# Patient Record
Sex: FEMALE | Race: WHITE | NOT HISPANIC OR LATINO | Employment: OTHER | URBAN - METROPOLITAN AREA
[De-identification: names, ages, dates, MRNs, and addresses within clinical notes are randomized per-mention and may not be internally consistent; named-entity substitution may affect disease eponyms.]

---

## 2017-01-01 ENCOUNTER — APPOINTMENT (OUTPATIENT)
Dept: CARDIOLOGY | Facility: HOSPITAL | Age: 72
End: 2017-01-01
Attending: PSYCHIATRY & NEUROLOGY

## 2017-01-01 ENCOUNTER — APPOINTMENT (OUTPATIENT)
Dept: CT IMAGING | Facility: HOSPITAL | Age: 72
End: 2017-01-01

## 2017-01-01 ENCOUNTER — APPOINTMENT (OUTPATIENT)
Dept: CT IMAGING | Facility: HOSPITAL | Age: 72
End: 2017-01-01
Attending: PSYCHIATRY & NEUROLOGY

## 2017-01-01 ENCOUNTER — APPOINTMENT (OUTPATIENT)
Dept: GENERAL RADIOLOGY | Facility: HOSPITAL | Age: 72
End: 2017-01-01

## 2017-01-01 ENCOUNTER — APPOINTMENT (OUTPATIENT)
Dept: MRI IMAGING | Facility: HOSPITAL | Age: 72
End: 2017-01-01

## 2017-01-01 ENCOUNTER — HOSPITAL ENCOUNTER (INPATIENT)
Facility: HOSPITAL | Age: 72
LOS: 6 days | End: 2017-06-30
Attending: EMERGENCY MEDICINE | Admitting: FAMILY MEDICINE

## 2017-01-01 DIAGNOSIS — R41.841 COGNITIVE COMMUNICATION DEFICIT: ICD-10-CM

## 2017-01-01 DIAGNOSIS — R10.84 GENERALIZED ABDOMINAL PAIN: ICD-10-CM

## 2017-01-01 DIAGNOSIS — Z74.09 IMPAIRED MOBILITY AND ADLS: ICD-10-CM

## 2017-01-01 DIAGNOSIS — R41.82 ALTERED MENTAL STATUS, UNSPECIFIED: Primary | ICD-10-CM

## 2017-01-01 DIAGNOSIS — Z74.09 IMPAIRED FUNCTIONAL MOBILITY, BALANCE, GAIT, AND ENDURANCE: ICD-10-CM

## 2017-01-01 DIAGNOSIS — Z78.9 IMPAIRED MOBILITY AND ADLS: ICD-10-CM

## 2017-01-01 DIAGNOSIS — A41.9 SEPSIS, DUE TO UNSPECIFIED ORGANISM: ICD-10-CM

## 2017-01-01 DIAGNOSIS — N39.0 ACUTE UTI: ICD-10-CM

## 2017-01-01 LAB
ABO GROUP BLD: NORMAL
ABO GROUP BLD: NORMAL
ALBUMIN SERPL-MCNC: 2.4 G/DL (ref 3.2–4.8)
ALBUMIN/GLOB SERPL: 0.6 G/DL (ref 1.5–2.5)
ALP SERPL-CCNC: 198 U/L (ref 25–100)
ALT SERPL W P-5'-P-CCNC: 11 U/L (ref 7–40)
AMMONIA BLD-SCNC: 17 UMOL/L (ref 19–60)
ANION GAP SERPL CALCULATED.3IONS-SCNC: 4 MMOL/L (ref 3–11)
ANION GAP SERPL CALCULATED.3IONS-SCNC: 4 MMOL/L (ref 3–11)
ANION GAP SERPL CALCULATED.3IONS-SCNC: 5 MMOL/L (ref 3–11)
ANION GAP SERPL CALCULATED.3IONS-SCNC: 6 MMOL/L (ref 3–11)
ANION GAP SERPL CALCULATED.3IONS-SCNC: 7 MMOL/L (ref 3–11)
ANION GAP SERPL CALCULATED.3IONS-SCNC: 7 MMOL/L (ref 3–11)
ANION GAP SERPL CALCULATED.3IONS-SCNC: 8 MMOL/L (ref 3–11)
APTT PPP: 31.9 SECONDS (ref 45–60)
APTT PPP: 32.2 SECONDS (ref 24–31)
APTT PPP: 32.4 SECONDS (ref 45–60)
APTT PPP: 41.8 SECONDS (ref 45–60)
APTT PPP: 45.6 SECONDS (ref 45–60)
APTT PPP: 55.3 SECONDS (ref 45–60)
APTT PPP: 56.5 SECONDS (ref 45–60)
ARTERIAL PATENCY WRIST A: ABNORMAL
ARTICHOKE IGE QN: 43 MG/DL (ref 0–130)
AST SERPL-CCNC: 13 U/L (ref 0–33)
ATMOSPHERIC PRESS: ABNORMAL MMHG
BACTERIA SPEC AEROBE CULT: ABNORMAL
BACTERIA SPEC AEROBE CULT: NORMAL
BACTERIA SPEC AEROBE CULT: NORMAL
BACTERIA UR QL AUTO: ABNORMAL /HPF
BASE EXCESS BLDA CALC-SCNC: -2.2 MMOL/L (ref 0–2)
BASOPHILS # BLD AUTO: 0 10*3/MM3 (ref 0–0.2)
BASOPHILS # BLD AUTO: 0.01 10*3/MM3 (ref 0–0.2)
BASOPHILS # BLD MANUAL: 0 10*3/MM3 (ref 0–0.2)
BASOPHILS # BLD MANUAL: 0 10*3/MM3 (ref 0–0.2)
BASOPHILS NFR BLD AUTO: 0 % (ref 0–1)
BASOPHILS NFR BLD AUTO: 0.1 % (ref 0–1)
BDY SITE: ABNORMAL
BH CV ECHO MEAS - AO ROOT AREA (BSA CORRECTED): 1.3
BH CV ECHO MEAS - AO ROOT AREA: 2.8 CM^2
BH CV ECHO MEAS - AO ROOT DIAM: 1.9 CM
BH CV ECHO MEAS - BSA(HAYCOCK): 1.4 M^2
BH CV ECHO MEAS - BSA: 1.4 M^2
BH CV ECHO MEAS - BZI_BMI: 17 KILOGRAMS/M^2
BH CV ECHO MEAS - BZI_METRIC_HEIGHT: 160 CM
BH CV ECHO MEAS - BZI_METRIC_WEIGHT: 43.5 KG
BH CV ECHO MEAS - CONTRAST EF (2CH): 51.4 ML/M^2
BH CV ECHO MEAS - CONTRAST EF 4CH: 57.1 ML/M^2
BH CV ECHO MEAS - EDV(CUBED): 37.6 ML
BH CV ECHO MEAS - EDV(MOD-SP2): 35 ML
BH CV ECHO MEAS - EDV(MOD-SP4): 63 ML
BH CV ECHO MEAS - EDV(TEICH): 45.8 ML
BH CV ECHO MEAS - EF(CUBED): 65 %
BH CV ECHO MEAS - EF(MOD-SP2): 51.4 %
BH CV ECHO MEAS - EF(MOD-SP4): 57.1 %
BH CV ECHO MEAS - EF(TEICH): 57.8 %
BH CV ECHO MEAS - ESV(CUBED): 13.1 ML
BH CV ECHO MEAS - ESV(MOD-SP2): 17 ML
BH CV ECHO MEAS - ESV(MOD-SP4): 27 ML
BH CV ECHO MEAS - ESV(TEICH): 19.3 ML
BH CV ECHO MEAS - FS: 29.6 %
BH CV ECHO MEAS - IVS/LVPW: 1
BH CV ECHO MEAS - IVSD: 1.1 CM
BH CV ECHO MEAS - LA DIMENSION: 2.2 CM
BH CV ECHO MEAS - LA/AO: 1.2
BH CV ECHO MEAS - LV DIASTOLIC VOL/BSA (35-75): 44.5 ML/M^2
BH CV ECHO MEAS - LV MASS(C)D: 104.7 GRAMS
BH CV ECHO MEAS - LV MASS(C)DI: 74 GRAMS/M^2
BH CV ECHO MEAS - LV SYSTOLIC VOL/BSA (12-30): 19.1 ML/M^2
BH CV ECHO MEAS - LVIDD: 3.4 CM
BH CV ECHO MEAS - LVIDS: 2.4 CM
BH CV ECHO MEAS - LVLD AP2: 3.6 CM
BH CV ECHO MEAS - LVLD AP4: 6.3 CM
BH CV ECHO MEAS - LVLS AP2: 4.1 CM
BH CV ECHO MEAS - LVLS AP4: 5.5 CM
BH CV ECHO MEAS - LVPWD: 1.1 CM
BH CV ECHO MEAS - MV A MAX VEL: 76.5 CM/SEC
BH CV ECHO MEAS - MV DEC SLOPE: 674 CM/SEC^2
BH CV ECHO MEAS - MV DEC TIME: 0.14 SEC
BH CV ECHO MEAS - MV E MAX VEL: 81.9 CM/SEC
BH CV ECHO MEAS - MV E/A: 1.1
BH CV ECHO MEAS - MV P1/2T MAX VEL: 86.9 CM/SEC
BH CV ECHO MEAS - MV P1/2T: 37.8 MSEC
BH CV ECHO MEAS - MVA P1/2T LCG: 2.5 CM^2
BH CV ECHO MEAS - MVA(P1/2T): 5.8 CM^2
BH CV ECHO MEAS - RAP SYSTOLE: 3 MMHG
BH CV ECHO MEAS - RVSP: 31 MMHG
BH CV ECHO MEAS - SI(CUBED): 17.3 ML/M^2
BH CV ECHO MEAS - SI(MOD-SP2): 12.7 ML/M^2
BH CV ECHO MEAS - SI(MOD-SP4): 25.4 ML/M^2
BH CV ECHO MEAS - SI(TEICH): 18.7 ML/M^2
BH CV ECHO MEAS - SV(CUBED): 24.5 ML
BH CV ECHO MEAS - SV(MOD-SP2): 18 ML
BH CV ECHO MEAS - SV(MOD-SP4): 36 ML
BH CV ECHO MEAS - SV(TEICH): 26.4 ML
BH CV ECHO MEAS - TAPSE (>1.6): 2.5 CM2
BH CV ECHO MEAS - TR MAX VEL: 265 CM/SEC
BH CV XLRA - RV BASE: 2.7 CM
BH CV XLRA - RV LENGTH: 5.3 CM
BH CV XLRA - RV MID: 2.4 CM
BH CV XLRA MEAS LEFT CCA RATIO VEL: 53.1 CM/SEC
BH CV XLRA MEAS LEFT DIST CCA EDV: 12.2 CM/SEC
BH CV XLRA MEAS LEFT DIST CCA PSV: 53.1 CM/SEC
BH CV XLRA MEAS LEFT DIST ICA EDV: 23.4 CM/SEC
BH CV XLRA MEAS LEFT DIST ICA PSV: 67.4 CM/SEC
BH CV XLRA MEAS LEFT ICA RATIO VEL: 53.8 CM/SEC
BH CV XLRA MEAS LEFT ICA/CCA RATIO: 1
BH CV XLRA MEAS LEFT MID ICA EDV: 11.5 CM/SEC
BH CV XLRA MEAS LEFT MID ICA PSV: 36.3 CM/SEC
BH CV XLRA MEAS LEFT PROX CCA EDV: 9.8 CM/SEC
BH CV XLRA MEAS LEFT PROX CCA PSV: 53.4 CM/SEC
BH CV XLRA MEAS LEFT PROX ECA PSV: 59 CM/SEC
BH CV XLRA MEAS LEFT PROX ICA EDV: 14.7 CM/SEC
BH CV XLRA MEAS LEFT PROX ICA PSV: 53.8 CM/SEC
BH CV XLRA MEAS LEFT PROX SCLA PSV: 94.3 CM/SEC
BH CV XLRA MEAS LEFT VERTEBRAL A EDV: 18.9 CM/SEC
BH CV XLRA MEAS LEFT VERTEBRAL A PSV: 54.5 CM/SEC
BH CV XLRA MEAS RIGHT CCA RATIO VEL: 65.9 CM/SEC
BH CV XLRA MEAS RIGHT DIST CCA EDV: 14 CM/SEC
BH CV XLRA MEAS RIGHT DIST CCA PSV: 65.9 CM/SEC
BH CV XLRA MEAS RIGHT DIST ICA EDV: 28.8 CM/SEC
BH CV XLRA MEAS RIGHT DIST ICA PSV: 76 CM/SEC
BH CV XLRA MEAS RIGHT ICA RATIO VEL: 72.5 CM/SEC
BH CV XLRA MEAS RIGHT ICA/CCA RATIO: 1.1
BH CV XLRA MEAS RIGHT MID ICA EDV: 15.3 CM/SEC
BH CV XLRA MEAS RIGHT MID ICA PSV: 72.5 CM/SEC
BH CV XLRA MEAS RIGHT PROX CCA EDV: 13.5 CM/SEC
BH CV XLRA MEAS RIGHT PROX CCA PSV: 43.7 CM/SEC
BH CV XLRA MEAS RIGHT PROX ECA PSV: 64.2 CM/SEC
BH CV XLRA MEAS RIGHT PROX ICA EDV: 12.2 CM/SEC
BH CV XLRA MEAS RIGHT PROX ICA PSV: 66.4 CM/SEC
BH CV XLRA MEAS RIGHT PROX SCLA PSV: 71.6 CM/SEC
BH CV XLRA MEAS RIGHT VERTEBRAL A EDV: 10.9 CM/SEC
BH CV XLRA MEAS RIGHT VERTEBRAL A PSV: 25.3 CM/SEC
BILIRUB SERPL-MCNC: 0.2 MG/DL (ref 0.3–1.2)
BILIRUB UR QL STRIP: ABNORMAL
BLD GP AB SCN SERPL QL: NEGATIVE
BUN BLD-MCNC: 13 MG/DL (ref 9–23)
BUN BLD-MCNC: 15 MG/DL (ref 9–23)
BUN BLD-MCNC: 17 MG/DL (ref 9–23)
BUN BLD-MCNC: 17 MG/DL (ref 9–23)
BUN BLD-MCNC: 9 MG/DL (ref 9–23)
BUN/CREAT SERPL: 25 (ref 7–25)
BUN/CREAT SERPL: 28.3 (ref 7–25)
BUN/CREAT SERPL: 30 (ref 7–25)
BUN/CREAT SERPL: 32.5 (ref 7–25)
BUN/CREAT SERPL: 32.5 (ref 7–25)
BUN/CREAT SERPL: 34 (ref 7–25)
BUN/CREAT SERPL: 43.3 (ref 7–25)
C DIFF TOX GENS STL QL NAA+PROBE: NOT DETECTED
CA-I SERPL ISE-MCNC: 0.97 MMOL/L (ref 1.12–1.32)
CA-I SERPL ISE-MCNC: 1.02 MMOL/L (ref 1.12–1.32)
CALCIUM SPEC-SCNC: 6.7 MG/DL (ref 8.7–10.4)
CALCIUM SPEC-SCNC: 7 MG/DL (ref 8.7–10.4)
CALCIUM SPEC-SCNC: 7.1 MG/DL (ref 8.7–10.4)
CALCIUM SPEC-SCNC: 7.2 MG/DL (ref 8.7–10.4)
CALCIUM SPEC-SCNC: 7.3 MG/DL (ref 8.7–10.4)
CALCIUM SPEC-SCNC: 7.4 MG/DL (ref 8.7–10.4)
CALCIUM SPEC-SCNC: 7.8 MG/DL (ref 8.7–10.4)
CHLORIDE SERPL-SCNC: 102 MMOL/L (ref 99–109)
CHLORIDE SERPL-SCNC: 110 MMOL/L (ref 99–109)
CHLORIDE SERPL-SCNC: 111 MMOL/L (ref 99–109)
CHLORIDE SERPL-SCNC: 112 MMOL/L (ref 99–109)
CHLORIDE SERPL-SCNC: 113 MMOL/L (ref 99–109)
CHLORIDE SERPL-SCNC: 114 MMOL/L (ref 99–109)
CHLORIDE SERPL-SCNC: 114 MMOL/L (ref 99–109)
CHOLEST SERPL-MCNC: 81 MG/DL (ref 0–200)
CLARITY UR: ABNORMAL
CO2 BLDA-SCNC: 21.8 MMOL/L (ref 22–33)
CO2 SERPL-SCNC: 19 MMOL/L (ref 20–31)
CO2 SERPL-SCNC: 20 MMOL/L (ref 20–31)
CO2 SERPL-SCNC: 21 MMOL/L (ref 20–31)
CO2 SERPL-SCNC: 21 MMOL/L (ref 20–31)
CO2 SERPL-SCNC: 23 MMOL/L (ref 20–31)
CO2 SERPL-SCNC: 24 MMOL/L (ref 20–31)
CO2 SERPL-SCNC: 27 MMOL/L (ref 20–31)
COHGB MFR BLD: 1.1 % (ref 0–2)
COLOR UR: ABNORMAL
CREAT BLD-MCNC: 0.3 MG/DL (ref 0.6–1.3)
CREAT BLD-MCNC: 0.3 MG/DL (ref 0.6–1.3)
CREAT BLD-MCNC: 0.4 MG/DL (ref 0.6–1.3)
CREAT BLD-MCNC: 0.4 MG/DL (ref 0.6–1.3)
CREAT BLD-MCNC: 0.5 MG/DL (ref 0.6–1.3)
CREAT BLD-MCNC: 0.6 MG/DL (ref 0.6–1.3)
CREAT BLD-MCNC: 0.6 MG/DL (ref 0.6–1.3)
D-LACTATE SERPL-SCNC: 0.6 MMOL/L (ref 0.5–2)
D-LACTATE SERPL-SCNC: 2.2 MMOL/L (ref 0.5–2)
D-LACTATE SERPL-SCNC: 2.3 MMOL/L (ref 0.5–2)
D-LACTATE SERPL-SCNC: 3.7 MMOL/L (ref 0.5–2)
DEPRECATED RDW RBC AUTO: 57.2 FL (ref 37–54)
DEPRECATED RDW RBC AUTO: 58.9 FL (ref 37–54)
DEPRECATED RDW RBC AUTO: 59.2 FL (ref 37–54)
DEPRECATED RDW RBC AUTO: 59.6 FL (ref 37–54)
DEPRECATED RDW RBC AUTO: 60.3 FL (ref 37–54)
DEPRECATED RDW RBC AUTO: 60.7 FL (ref 37–54)
DEPRECATED RDW RBC AUTO: 60.7 FL (ref 37–54)
DEPRECATED RDW RBC AUTO: 61.4 FL (ref 37–54)
EOSINOPHIL # BLD AUTO: 0 10*3/MM3 (ref 0.1–0.3)
EOSINOPHIL # BLD AUTO: 0 10*3/MM3 (ref 0–0.3)
EOSINOPHIL # BLD AUTO: 0.01 10*3/MM3 (ref 0–0.3)
EOSINOPHIL # BLD AUTO: 0.02 10*3/MM3 (ref 0–0.3)
EOSINOPHIL # BLD MANUAL: 0 10*3/MM3 (ref 0.1–0.3)
EOSINOPHIL # BLD MANUAL: 0 10*3/MM3 (ref 0.1–0.3)
EOSINOPHIL NFR BLD AUTO: 0 % (ref 0–3)
EOSINOPHIL NFR BLD AUTO: 0 % (ref 0–3)
EOSINOPHIL NFR BLD AUTO: 0.1 % (ref 0–3)
EOSINOPHIL NFR BLD AUTO: 0.2 % (ref 0–3)
EOSINOPHIL NFR BLD MANUAL: 0 % (ref 0–3)
EOSINOPHIL NFR BLD MANUAL: 0 % (ref 0–3)
ERYTHROCYTE [DISTWIDTH] IN BLOOD BY AUTOMATED COUNT: 16.2 % (ref 11.3–14.5)
ERYTHROCYTE [DISTWIDTH] IN BLOOD BY AUTOMATED COUNT: 16.3 % (ref 11.3–14.5)
ERYTHROCYTE [DISTWIDTH] IN BLOOD BY AUTOMATED COUNT: 16.5 % (ref 11.3–14.5)
ERYTHROCYTE [DISTWIDTH] IN BLOOD BY AUTOMATED COUNT: 16.7 % (ref 11.3–14.5)
ERYTHROCYTE [DISTWIDTH] IN BLOOD BY AUTOMATED COUNT: 16.7 % (ref 11.3–14.5)
ERYTHROCYTE [DISTWIDTH] IN BLOOD BY AUTOMATED COUNT: 16.9 % (ref 11.3–14.5)
ERYTHROCYTE [DISTWIDTH] IN BLOOD BY AUTOMATED COUNT: 17 % (ref 11.3–14.5)
ERYTHROCYTE [DISTWIDTH] IN BLOOD BY AUTOMATED COUNT: 17 % (ref 11.3–14.5)
GFR SERPL CREATININE-BSD FRML MDRD: 122 ML/MIN/1.73
GFR SERPL CREATININE-BSD FRML MDRD: 99 ML/MIN/1.73
GFR SERPL CREATININE-BSD FRML MDRD: 99 ML/MIN/1.73
GFR SERPL CREATININE-BSD FRML MDRD: >150 ML/MIN/1.73
GLOBULIN UR ELPH-MCNC: 4.1 GM/DL
GLUCOSE BLD-MCNC: 111 MG/DL (ref 70–100)
GLUCOSE BLD-MCNC: 58 MG/DL (ref 70–100)
GLUCOSE BLD-MCNC: 72 MG/DL (ref 70–100)
GLUCOSE BLD-MCNC: 78 MG/DL (ref 70–100)
GLUCOSE BLD-MCNC: 87 MG/DL (ref 70–100)
GLUCOSE BLD-MCNC: 88 MG/DL (ref 70–100)
GLUCOSE BLD-MCNC: 96 MG/DL (ref 70–100)
GLUCOSE BLDC GLUCOMTR-MCNC: 118 MG/DL (ref 70–130)
GLUCOSE BLDC GLUCOMTR-MCNC: 71 MG/DL (ref 70–130)
GLUCOSE UR STRIP-MCNC: NEGATIVE MG/DL
HBA1C MFR BLD: 5.6 % (ref 4.8–5.6)
HCO3 BLDA-SCNC: 20.9 MMOL/L (ref 20–26)
HCT VFR BLD AUTO: 31.2 % (ref 34.5–44)
HCT VFR BLD AUTO: 31.8 % (ref 34.5–44)
HCT VFR BLD AUTO: 32.3 % (ref 34.5–44)
HCT VFR BLD AUTO: 32.8 % (ref 34.5–44)
HCT VFR BLD AUTO: 33.1 % (ref 34.5–44)
HCT VFR BLD AUTO: 33.7 % (ref 34.5–44)
HCT VFR BLD AUTO: 35.9 % (ref 34.5–44)
HCT VFR BLD AUTO: 40.7 % (ref 34.5–44)
HCT VFR BLD CALC: 30.6 %
HDLC SERPL-MCNC: 25 MG/DL (ref 40–60)
HGB BLD-MCNC: 10.1 G/DL (ref 11.5–15.5)
HGB BLD-MCNC: 10.2 G/DL (ref 11.5–15.5)
HGB BLD-MCNC: 10.5 G/DL (ref 11.5–15.5)
HGB BLD-MCNC: 10.6 G/DL (ref 11.5–15.5)
HGB BLD-MCNC: 11.1 G/DL (ref 11.5–15.5)
HGB BLD-MCNC: 12.9 G/DL (ref 11.5–15.5)
HGB BLDA-MCNC: 10 G/DL (ref 14–18)
HGB UR QL STRIP.AUTO: NEGATIVE
HOLD SPECIMEN: NORMAL
HOLD SPECIMEN: NORMAL
HOROWITZ INDEX BLD+IHG-RTO: 21 %
HYALINE CASTS UR QL AUTO: ABNORMAL /LPF
IMM GRANULOCYTES # BLD: 0.03 10*3/MM3 (ref 0–0.03)
IMM GRANULOCYTES # BLD: 0.04 10*3/MM3 (ref 0–0.03)
IMM GRANULOCYTES # BLD: 0.04 10*3/MM3 (ref 0–0.03)
IMM GRANULOCYTES # BLD: 0.05 10*3/MM3 (ref 0–0.03)
IMM GRANULOCYTES NFR BLD: 0.3 % (ref 0–0.6)
IMM GRANULOCYTES NFR BLD: 0.3 % (ref 0–0.6)
IMM GRANULOCYTES NFR BLD: 0.4 % (ref 0–0.6)
IMM GRANULOCYTES NFR BLD: 0.6 % (ref 0–0.6)
INR PPP: 1.12
KETONES UR QL STRIP: ABNORMAL
LEFT ATRIUM VOLUME INDEX: 14.1 ML/M2
LEFT ATRIUM VOLUME: 20 CM3
LEUKOCYTE ESTERASE UR QL STRIP.AUTO: ABNORMAL
LIPASE SERPL-CCNC: 19 U/L (ref 6–51)
LV EF 2D ECHO EST: 55 %
LYMPHOCYTES # BLD AUTO: 2.77 10*3/MM3 (ref 0.6–4.8)
LYMPHOCYTES # BLD AUTO: 3.58 10*3/MM3 (ref 0.6–4.8)
LYMPHOCYTES # BLD AUTO: 4.43 10*3/MM3 (ref 0.6–4.8)
LYMPHOCYTES # BLD AUTO: 5.44 10*3/MM3 (ref 0.6–4.8)
LYMPHOCYTES # BLD MANUAL: 2.66 10*3/MM3 (ref 0.6–4.8)
LYMPHOCYTES # BLD MANUAL: 7.23 10*3/MM3 (ref 0.6–4.8)
LYMPHOCYTES NFR BLD AUTO: 34.4 % (ref 24–44)
LYMPHOCYTES NFR BLD AUTO: 37.7 % (ref 24–44)
LYMPHOCYTES NFR BLD AUTO: 41.5 % (ref 24–44)
LYMPHOCYTES NFR BLD AUTO: 47.3 % (ref 24–44)
LYMPHOCYTES NFR BLD MANUAL: 10 % (ref 0–12)
LYMPHOCYTES NFR BLD MANUAL: 22 % (ref 24–44)
LYMPHOCYTES NFR BLD MANUAL: 3 % (ref 0–12)
LYMPHOCYTES NFR BLD MANUAL: 57 % (ref 24–44)
MAGNESIUM SERPL-MCNC: 1.7 MG/DL (ref 1.3–2.7)
MAGNESIUM SERPL-MCNC: 2 MG/DL (ref 1.3–2.7)
MCH RBC QN AUTO: 30.6 PG (ref 27–31)
MCH RBC QN AUTO: 30.7 PG (ref 27–31)
MCH RBC QN AUTO: 30.9 PG (ref 27–31)
MCH RBC QN AUTO: 31 PG (ref 27–31)
MCH RBC QN AUTO: 31.2 PG (ref 27–31)
MCH RBC QN AUTO: 31.3 PG (ref 27–31)
MCH RBC QN AUTO: 31.6 PG (ref 27–31)
MCH RBC QN AUTO: 31.7 PG (ref 27–31)
MCHC RBC AUTO-ENTMCNC: 30.9 G/DL (ref 32–36)
MCHC RBC AUTO-ENTMCNC: 31.5 G/DL (ref 32–36)
MCHC RBC AUTO-ENTMCNC: 31.7 G/DL (ref 32–36)
MCHC RBC AUTO-ENTMCNC: 31.7 G/DL (ref 32–36)
MCHC RBC AUTO-ENTMCNC: 31.8 G/DL (ref 32–36)
MCHC RBC AUTO-ENTMCNC: 32 G/DL (ref 32–36)
MCHC RBC AUTO-ENTMCNC: 32.5 G/DL (ref 32–36)
MCHC RBC AUTO-ENTMCNC: 32.7 G/DL (ref 32–36)
MCV RBC AUTO: 100 FL (ref 80–99)
MCV RBC AUTO: 96.4 FL (ref 80–99)
MCV RBC AUTO: 96.8 FL (ref 80–99)
MCV RBC AUTO: 96.9 FL (ref 80–99)
MCV RBC AUTO: 97.3 FL (ref 80–99)
MCV RBC AUTO: 97.7 FL (ref 80–99)
MCV RBC AUTO: 98.5 FL (ref 80–99)
MCV RBC AUTO: 98.5 FL (ref 80–99)
METHGB BLD QL: 0 %
MODALITY: ABNORMAL
MONOCYTES # BLD AUTO: 0.36 10*3/MM3 (ref 0–1)
MONOCYTES # BLD AUTO: 0.54 10*3/MM3 (ref 0–1)
MONOCYTES # BLD AUTO: 0.66 10*3/MM3 (ref 0–1)
MONOCYTES # BLD AUTO: 0.71 10*3/MM3 (ref 0–1)
MONOCYTES # BLD AUTO: 0.73 10*3/MM3 (ref 0–1)
MONOCYTES # BLD AUTO: 1.27 10*3/MM3 (ref 0–1)
MONOCYTES NFR BLD AUTO: 6.3 % (ref 0–12)
MONOCYTES NFR BLD AUTO: 6.7 % (ref 0–12)
MONOCYTES NFR BLD AUTO: 6.7 % (ref 0–12)
MONOCYTES NFR BLD AUTO: 6.9 % (ref 0–12)
NEUTROPHILS # BLD AUTO: 4.18 10*3/MM3 (ref 1.5–8.3)
NEUTROPHILS # BLD AUTO: 4.69 10*3/MM3 (ref 1.5–8.3)
NEUTROPHILS # BLD AUTO: 5.2 10*3/MM3 (ref 1.5–8.3)
NEUTROPHILS # BLD AUTO: 5.26 10*3/MM3 (ref 1.5–8.3)
NEUTROPHILS # BLD AUTO: 5.49 10*3/MM3 (ref 1.5–8.3)
NEUTROPHILS # BLD AUTO: 9.08 10*3/MM3 (ref 1.5–8.3)
NEUTROPHILS NFR BLD AUTO: 45.8 % (ref 41–71)
NEUTROPHILS NFR BLD AUTO: 51.4 % (ref 41–71)
NEUTROPHILS NFR BLD AUTO: 54.8 % (ref 41–71)
NEUTROPHILS NFR BLD AUTO: 58.3 % (ref 41–71)
NEUTROPHILS NFR BLD MANUAL: 22 % (ref 41–71)
NEUTROPHILS NFR BLD MANUAL: 54 % (ref 41–71)
NEUTS BAND NFR BLD MANUAL: 11 % (ref 0–5)
NEUTS BAND NFR BLD MANUAL: 21 % (ref 0–5)
NITRITE UR QL STRIP: POSITIVE
NRBC SPEC MANUAL: 1 /100 WBC (ref 0–0)
OXYHGB MFR BLDV: 99.2 % (ref 94–99)
PCO2 BLDA: 27.7 MM HG (ref 35–45)
PH BLDA: 7.49 PH UNITS (ref 7.35–7.45)
PH UR STRIP.AUTO: 5.5 [PH] (ref 5–8)
PLAT MORPH BLD: NORMAL
PLATELET # BLD AUTO: 365 10*3/MM3 (ref 150–450)
PLATELET # BLD AUTO: 388 10*3/MM3 (ref 150–450)
PLATELET # BLD AUTO: 393 10*3/MM3 (ref 150–450)
PLATELET # BLD AUTO: 408 10*3/MM3 (ref 150–450)
PLATELET # BLD AUTO: 411 10*3/MM3 (ref 150–450)
PLATELET # BLD AUTO: 425 10*3/MM3 (ref 150–450)
PLATELET # BLD AUTO: 449 10*3/MM3 (ref 150–450)
PLATELET # BLD AUTO: 485 10*3/MM3 (ref 150–450)
PMV BLD AUTO: 8.4 FL (ref 6–12)
PMV BLD AUTO: 8.6 FL (ref 6–12)
PMV BLD AUTO: 8.8 FL (ref 6–12)
PMV BLD AUTO: 8.8 FL (ref 6–12)
PMV BLD AUTO: 9 FL (ref 6–12)
PMV BLD AUTO: 9 FL (ref 6–12)
PO2 BLDA: 91.3 MM HG (ref 83–108)
POTASSIUM BLD-SCNC: 3.1 MMOL/L (ref 3.5–5.5)
POTASSIUM BLD-SCNC: 3.2 MMOL/L (ref 3.5–5.5)
POTASSIUM BLD-SCNC: 3.4 MMOL/L (ref 3.5–5.5)
POTASSIUM BLD-SCNC: 3.6 MMOL/L (ref 3.5–5.5)
POTASSIUM BLD-SCNC: 3.6 MMOL/L (ref 3.5–5.5)
POTASSIUM BLD-SCNC: 3.7 MMOL/L (ref 3.5–5.5)
POTASSIUM BLD-SCNC: 4.2 MMOL/L (ref 3.5–5.5)
POTASSIUM BLD-SCNC: 4.3 MMOL/L (ref 3.5–5.5)
PROT SERPL-MCNC: 6.5 G/DL (ref 5.7–8.2)
PROT UR QL STRIP: ABNORMAL
PROTHROMBIN TIME: 12.2 SECONDS (ref 9.6–11.5)
RBC # BLD AUTO: 3.22 10*6/MM3 (ref 3.89–5.14)
RBC # BLD AUTO: 3.3 10*6/MM3 (ref 3.89–5.14)
RBC # BLD AUTO: 3.32 10*6/MM3 (ref 3.89–5.14)
RBC # BLD AUTO: 3.36 10*6/MM3 (ref 3.89–5.14)
RBC # BLD AUTO: 3.39 10*6/MM3 (ref 3.89–5.14)
RBC # BLD AUTO: 3.45 10*6/MM3 (ref 3.89–5.14)
RBC # BLD AUTO: 3.59 10*6/MM3 (ref 3.89–5.14)
RBC # BLD AUTO: 4.13 10*6/MM3 (ref 3.89–5.14)
RBC # UR: ABNORMAL /HPF
RBC MORPH BLD: NORMAL
REF LAB TEST METHOD: ABNORMAL
RH BLD: NEGATIVE
RH BLD: NEGATIVE
SODIUM BLD-SCNC: 136 MMOL/L (ref 132–146)
SODIUM BLD-SCNC: 138 MMOL/L (ref 132–146)
SODIUM BLD-SCNC: 138 MMOL/L (ref 132–146)
SODIUM BLD-SCNC: 139 MMOL/L (ref 132–146)
SODIUM BLD-SCNC: 139 MMOL/L (ref 132–146)
SODIUM BLD-SCNC: 140 MMOL/L (ref 132–146)
SODIUM BLD-SCNC: 142 MMOL/L (ref 132–146)
SP GR UR STRIP: 1.02 (ref 1–1.03)
SQUAMOUS #/AREA URNS HPF: ABNORMAL /HPF
TRIGL SERPL-MCNC: 94 MG/DL (ref 0–150)
TROPONIN I SERPL-MCNC: 0 NG/ML (ref 0–0.07)
UROBILINOGEN UR QL STRIP: ABNORMAL
WBC MORPH BLD: NORMAL
WBC NRBC COR # BLD: 10.67 10*3/MM3 (ref 3.5–10.8)
WBC NRBC COR # BLD: 11.5 10*3/MM3 (ref 3.5–10.8)
WBC NRBC COR # BLD: 12.1 10*3/MM3 (ref 3.5–10.8)
WBC NRBC COR # BLD: 12.68 10*3/MM3 (ref 3.5–10.8)
WBC NRBC COR # BLD: 8.05 10*3/MM3 (ref 3.5–10.8)
WBC NRBC COR # BLD: 8.21 10*3/MM3 (ref 3.5–10.8)
WBC NRBC COR # BLD: 8.41 10*3/MM3 (ref 3.5–10.8)
WBC NRBC COR # BLD: 9.5 10*3/MM3 (ref 3.5–10.8)
WBC UR QL AUTO: ABNORMAL /HPF
WHOLE BLOOD HOLD SPECIMEN: NORMAL
WHOLE BLOOD HOLD SPECIMEN: NORMAL

## 2017-01-01 PROCEDURE — 99285 EMERGENCY DEPT VISIT HI MDM: CPT

## 2017-01-01 PROCEDURE — 4A02X4A MEASUREMENT OF CARDIAC ELECTRICAL ACTIVITY, GUIDANCE, EXTERNAL APPROACH: ICD-10-PCS | Performed by: FAMILY MEDICINE

## 2017-01-01 PROCEDURE — 92610 EVALUATE SWALLOWING FUNCTION: CPT

## 2017-01-01 PROCEDURE — 84484 ASSAY OF TROPONIN QUANT: CPT

## 2017-01-01 PROCEDURE — 99232 SBSQ HOSP IP/OBS MODERATE 35: CPT | Performed by: PSYCHIATRY & NEUROLOGY

## 2017-01-01 PROCEDURE — 99291 CRITICAL CARE FIRST HOUR: CPT | Performed by: INTERNAL MEDICINE

## 2017-01-01 PROCEDURE — P9041 ALBUMIN (HUMAN),5%, 50ML: HCPCS

## 2017-01-01 PROCEDURE — 87493 C DIFF AMPLIFIED PROBE: CPT | Performed by: COLON & RECTAL SURGERY

## 2017-01-01 PROCEDURE — 25010000002 ONDANSETRON PER 1 MG: Performed by: NURSE PRACTITIONER

## 2017-01-01 PROCEDURE — 85730 THROMBOPLASTIN TIME PARTIAL: CPT

## 2017-01-01 PROCEDURE — 80048 BASIC METABOLIC PNL TOTAL CA: CPT | Performed by: INTERNAL MEDICINE

## 2017-01-01 PROCEDURE — 86900 BLOOD TYPING SEROLOGIC ABO: CPT | Performed by: EMERGENCY MEDICINE

## 2017-01-01 PROCEDURE — C1751 CATH, INF, PER/CENT/MIDLINE: HCPCS

## 2017-01-01 PROCEDURE — 85007 BL SMEAR W/DIFF WBC COUNT: CPT | Performed by: INTERNAL MEDICINE

## 2017-01-01 PROCEDURE — 85025 COMPLETE CBC W/AUTO DIFF WBC: CPT | Performed by: NURSE PRACTITIONER

## 2017-01-01 PROCEDURE — 86901 BLOOD TYPING SEROLOGIC RH(D): CPT | Performed by: EMERGENCY MEDICINE

## 2017-01-01 PROCEDURE — 80053 COMPREHEN METABOLIC PANEL: CPT | Performed by: NURSE PRACTITIONER

## 2017-01-01 PROCEDURE — 97110 THERAPEUTIC EXERCISES: CPT

## 2017-01-01 PROCEDURE — 99222 1ST HOSP IP/OBS MODERATE 55: CPT | Performed by: INTERNAL MEDICINE

## 2017-01-01 PROCEDURE — 25010000002 HEPARIN (PORCINE) PER 1000 UNITS

## 2017-01-01 PROCEDURE — 70496 CT ANGIOGRAPHY HEAD: CPT

## 2017-01-01 PROCEDURE — 25010000002 MIDAZOLAM PER 1 MG

## 2017-01-01 PROCEDURE — 81001 URINALYSIS AUTO W/SCOPE: CPT | Performed by: NURSE PRACTITIONER

## 2017-01-01 PROCEDURE — 25010000002 FENTANYL CITRATE (PF) 100 MCG/2ML SOLUTION

## 2017-01-01 PROCEDURE — 86850 RBC ANTIBODY SCREEN: CPT | Performed by: EMERGENCY MEDICINE

## 2017-01-01 PROCEDURE — 25010000003 POTASSIUM CHLORIDE PER 2 MEQ

## 2017-01-01 PROCEDURE — 36600 WITHDRAWAL OF ARTERIAL BLOOD: CPT | Performed by: NURSE PRACTITIONER

## 2017-01-01 PROCEDURE — 82140 ASSAY OF AMMONIA: CPT | Performed by: NURSE PRACTITIONER

## 2017-01-01 PROCEDURE — 70450 CT HEAD/BRAIN W/O DYE: CPT

## 2017-01-01 PROCEDURE — 80048 BASIC METABOLIC PNL TOTAL CA: CPT | Performed by: FAMILY MEDICINE

## 2017-01-01 PROCEDURE — 99223 1ST HOSP IP/OBS HIGH 75: CPT | Performed by: FAMILY MEDICINE

## 2017-01-01 PROCEDURE — 97164 PT RE-EVAL EST PLAN CARE: CPT

## 2017-01-01 PROCEDURE — 87077 CULTURE AEROBIC IDENTIFY: CPT | Performed by: NURSE PRACTITIONER

## 2017-01-01 PROCEDURE — 25010000002 MAGNESIUM SULFATE 2 GM/50ML SOLUTION

## 2017-01-01 PROCEDURE — 25010000002 HYDROCORTISONE SODIUM SUCCINATE 100 MG RECONSTITUTED SOLUTION: Performed by: INTERNAL MEDICINE

## 2017-01-01 PROCEDURE — 85027 COMPLETE CBC AUTOMATED: CPT | Performed by: FAMILY MEDICINE

## 2017-01-01 PROCEDURE — 25010000002 PIPERACILLIN-TAZOBACTAM: Performed by: INTERNAL MEDICINE

## 2017-01-01 PROCEDURE — 87040 BLOOD CULTURE FOR BACTERIA: CPT | Performed by: INTERNAL MEDICINE

## 2017-01-01 PROCEDURE — 92612 ENDOSCOPY SWALLOW (FEES) VID: CPT

## 2017-01-01 PROCEDURE — 99292 CRITICAL CARE ADDL 30 MIN: CPT | Performed by: INTERNAL MEDICINE

## 2017-01-01 PROCEDURE — 87086 URINE CULTURE/COLONY COUNT: CPT | Performed by: NURSE PRACTITIONER

## 2017-01-01 PROCEDURE — 82805 BLOOD GASES W/O2 SATURATION: CPT | Performed by: NURSE PRACTITIONER

## 2017-01-01 PROCEDURE — 92523 SPEECH SOUND LANG COMPREHEN: CPT

## 2017-01-01 PROCEDURE — 87040 BLOOD CULTURE FOR BACTERIA: CPT | Performed by: NURSE PRACTITIONER

## 2017-01-01 PROCEDURE — 25010000002 THIAMINE PER 100 MG: Performed by: PSYCHIATRY & NEUROLOGY

## 2017-01-01 PROCEDURE — 72170 X-RAY EXAM OF PELVIS: CPT

## 2017-01-01 PROCEDURE — 85027 COMPLETE CBC AUTOMATED: CPT | Performed by: INTERNAL MEDICINE

## 2017-01-01 PROCEDURE — 25010000003 CEFTRIAXONE PER 250 MG: Performed by: FAMILY MEDICINE

## 2017-01-01 PROCEDURE — 25010000002 ALTEPLASE PER 1 MG: Performed by: PSYCHIATRY & NEUROLOGY

## 2017-01-01 PROCEDURE — G0108 DIAB MANAGE TRN  PER INDIV: HCPCS | Performed by: REGISTERED NURSE

## 2017-01-01 PROCEDURE — 83036 HEMOGLOBIN GLYCOSYLATED A1C: CPT | Performed by: PSYCHIATRY & NEUROLOGY

## 2017-01-01 PROCEDURE — 85025 COMPLETE CBC W/AUTO DIFF WBC: CPT | Performed by: INTERNAL MEDICINE

## 2017-01-01 PROCEDURE — P9041 ALBUMIN (HUMAN),5%, 50ML: HCPCS | Performed by: NURSE PRACTITIONER

## 2017-01-01 PROCEDURE — 99223 1ST HOSP IP/OBS HIGH 75: CPT | Performed by: PSYCHIATRY & NEUROLOGY

## 2017-01-01 PROCEDURE — 74176 CT ABD & PELVIS W/O CONTRAST: CPT

## 2017-01-01 PROCEDURE — 25010000002 HEPARIN (PORCINE) PER 1000 UNITS: Performed by: NURSE PRACTITIONER

## 2017-01-01 PROCEDURE — 99232 SBSQ HOSP IP/OBS MODERATE 35: CPT | Performed by: FAMILY MEDICINE

## 2017-01-01 PROCEDURE — 25010000002 ENOXAPARIN PER 10 MG

## 2017-01-01 PROCEDURE — 25010000002 ENOXAPARIN PER 10 MG: Performed by: FAMILY MEDICINE

## 2017-01-01 PROCEDURE — 0 GADOBENATE DIMEGLUMINE 529 MG/ML SOLUTION: Performed by: INTERNAL MEDICINE

## 2017-01-01 PROCEDURE — 85007 BL SMEAR W/DIFF WBC COUNT: CPT | Performed by: NURSE PRACTITIONER

## 2017-01-01 PROCEDURE — 85610 PROTHROMBIN TIME: CPT | Performed by: NURSE PRACTITIONER

## 2017-01-01 PROCEDURE — 25010000003 POTASSIUM CHLORIDE 10 MEQ/100ML SOLUTION

## 2017-01-01 PROCEDURE — 93880 EXTRACRANIAL BILAT STUDY: CPT

## 2017-01-01 PROCEDURE — 25010000002 MORPHINE PER 10 MG: Performed by: INTERNAL MEDICINE

## 2017-01-01 PROCEDURE — 70498 CT ANGIOGRAPHY NECK: CPT

## 2017-01-01 PROCEDURE — A9577 INJ MULTIHANCE: HCPCS | Performed by: INTERNAL MEDICINE

## 2017-01-01 PROCEDURE — 25010000003 CEFTRIAXONE PER 250 MG: Performed by: NURSE PRACTITIONER

## 2017-01-01 PROCEDURE — 85730 THROMBOPLASTIN TIME PARTIAL: CPT | Performed by: NURSE PRACTITIONER

## 2017-01-01 PROCEDURE — 74000 HC ABDOMEN KUB: CPT

## 2017-01-01 PROCEDURE — 0D7N8DZ DILATION OF SIGMOID COLON WITH INTRALUMINAL DEVICE, VIA NATURAL OR ARTIFICIAL OPENING ENDOSCOPIC: ICD-10-PCS | Performed by: COLON & RECTAL SURGERY

## 2017-01-01 PROCEDURE — 83690 ASSAY OF LIPASE: CPT | Performed by: NURSE PRACTITIONER

## 2017-01-01 PROCEDURE — 93306 TTE W/DOPPLER COMPLETE: CPT

## 2017-01-01 PROCEDURE — 83735 ASSAY OF MAGNESIUM: CPT | Performed by: INTERNAL MEDICINE

## 2017-01-01 PROCEDURE — 25010000002 MIDAZOLAM PER 1 MG: Performed by: COLON & RECTAL SURGERY

## 2017-01-01 PROCEDURE — 3E03317 INTRODUCTION OF OTHER THROMBOLYTIC INTO PERIPHERAL VEIN, PERCUTANEOUS APPROACH: ICD-10-PCS | Performed by: PSYCHIATRY & NEUROLOGY

## 2017-01-01 PROCEDURE — 36415 COLL VENOUS BLD VENIPUNCTURE: CPT

## 2017-01-01 PROCEDURE — 83735 ASSAY OF MAGNESIUM: CPT | Performed by: FAMILY MEDICINE

## 2017-01-01 PROCEDURE — 86901 BLOOD TYPING SEROLOGIC RH(D): CPT

## 2017-01-01 PROCEDURE — 87186 SC STD MICRODIL/AGAR DIL: CPT | Performed by: NURSE PRACTITIONER

## 2017-01-01 PROCEDURE — 25010000002 ONDANSETRON PER 1 MG: Performed by: FAMILY MEDICINE

## 2017-01-01 PROCEDURE — 93306 TTE W/DOPPLER COMPLETE: CPT | Performed by: INTERNAL MEDICINE

## 2017-01-01 PROCEDURE — 25010000002 ALBUMIN HUMAN 5% PER 50 ML: Performed by: NURSE PRACTITIONER

## 2017-01-01 PROCEDURE — 97167 OT EVAL HIGH COMPLEX 60 MIN: CPT | Performed by: OCCUPATIONAL THERAPIST

## 2017-01-01 PROCEDURE — 02HV33Z INSERTION OF INFUSION DEVICE INTO SUPERIOR VENA CAVA, PERCUTANEOUS APPROACH: ICD-10-PCS | Performed by: FAMILY MEDICINE

## 2017-01-01 PROCEDURE — 86900 BLOOD TYPING SEROLOGIC ABO: CPT

## 2017-01-01 PROCEDURE — 84132 ASSAY OF SERUM POTASSIUM: CPT | Performed by: FAMILY MEDICINE

## 2017-01-01 PROCEDURE — 80061 LIPID PANEL: CPT | Performed by: PSYCHIATRY & NEUROLOGY

## 2017-01-01 PROCEDURE — 83605 ASSAY OF LACTIC ACID: CPT | Performed by: NURSE PRACTITIONER

## 2017-01-01 PROCEDURE — C1876 STENT, NON-COA/NON-COV W/DEL: HCPCS | Performed by: COLON & RECTAL SURGERY

## 2017-01-01 PROCEDURE — 93880 EXTRACRANIAL BILAT STUDY: CPT | Performed by: INTERNAL MEDICINE

## 2017-01-01 PROCEDURE — 25010000002 ALBUMIN HUMAN 5% PER 50 ML

## 2017-01-01 PROCEDURE — 97163 PT EVAL HIGH COMPLEX 45 MIN: CPT | Performed by: PHYSICAL THERAPIST

## 2017-01-01 PROCEDURE — 93005 ELECTROCARDIOGRAM TRACING: CPT | Performed by: NURSE PRACTITIONER

## 2017-01-01 PROCEDURE — 87150 DNA/RNA AMPLIFIED PROBE: CPT | Performed by: INTERNAL MEDICINE

## 2017-01-01 PROCEDURE — 82330 ASSAY OF CALCIUM: CPT | Performed by: INTERNAL MEDICINE

## 2017-01-01 PROCEDURE — 85025 COMPLETE CBC W/AUTO DIFF WBC: CPT | Performed by: COLON & RECTAL SURGERY

## 2017-01-01 PROCEDURE — 84132 ASSAY OF SERUM POTASSIUM: CPT | Performed by: INTERNAL MEDICINE

## 2017-01-01 PROCEDURE — 83605 ASSAY OF LACTIC ACID: CPT | Performed by: INTERNAL MEDICINE

## 2017-01-01 PROCEDURE — 0042T HC CT CEREBRAL PERFUSION W/WO CONTRAST: CPT

## 2017-01-01 PROCEDURE — 92507 TX SP LANG VOICE COMM INDIV: CPT

## 2017-01-01 PROCEDURE — 83605 ASSAY OF LACTIC ACID: CPT | Performed by: EMERGENCY MEDICINE

## 2017-01-01 PROCEDURE — C1894 INTRO/SHEATH, NON-LASER: HCPCS

## 2017-01-01 PROCEDURE — 25010000002 HYDROMORPHONE PER 4 MG: Performed by: FAMILY MEDICINE

## 2017-01-01 PROCEDURE — 99232 SBSQ HOSP IP/OBS MODERATE 35: CPT | Performed by: INTERNAL MEDICINE

## 2017-01-01 PROCEDURE — 25010000002 MORPHINE SULFATE (PF) 2 MG/ML SOLUTION: Performed by: EMERGENCY MEDICINE

## 2017-01-01 PROCEDURE — 70553 MRI BRAIN STEM W/O & W/DYE: CPT

## 2017-01-01 PROCEDURE — 0 IOPAMIDOL PER 1 ML: Performed by: FAMILY MEDICINE

## 2017-01-01 PROCEDURE — 82962 GLUCOSE BLOOD TEST: CPT

## 2017-01-01 PROCEDURE — 84132 ASSAY OF SERUM POTASSIUM: CPT | Performed by: NURSE PRACTITIONER

## 2017-01-01 PROCEDURE — 25010000003 CEFTRIAXONE PER 250 MG: Performed by: INTERNAL MEDICINE

## 2017-01-01 PROCEDURE — 87076 CULTURE ANAEROBE IDENT EACH: CPT | Performed by: INTERNAL MEDICINE

## 2017-01-01 PROCEDURE — 71010 HC CHEST PA OR AP: CPT

## 2017-01-01 DEVICE — STENT SYSTEM WITH ANCHOR LOCK DELIVERY SYSTEM
Type: IMPLANTABLE DEVICE | Site: SIGMOID COLON | Status: FUNCTIONAL
Brand: WALLFLEX™ COLONIC

## 2017-01-01 RX ORDER — SODIUM CHLORIDE 0.9 % (FLUSH) 0.9 %
1-10 SYRINGE (ML) INJECTION AS NEEDED
Status: DISCONTINUED | OUTPATIENT
Start: 2017-01-01 | End: 2017-07-01 | Stop reason: HOSPADM

## 2017-01-01 RX ORDER — MIDAZOLAM HYDROCHLORIDE 5 MG/ML
INJECTION INTRAMUSCULAR; INTRAVENOUS
Status: COMPLETED | OUTPATIENT
Start: 2017-01-01 | End: 2017-01-01

## 2017-01-01 RX ORDER — SODIUM CHLORIDE 0.9 % (FLUSH) 0.9 %
10 SYRINGE (ML) INJECTION AS NEEDED
Status: DISCONTINUED | OUTPATIENT
Start: 2017-01-01 | End: 2017-07-01 | Stop reason: HOSPADM

## 2017-01-01 RX ORDER — POTASSIUM CHLORIDE 750 MG/1
40 CAPSULE, EXTENDED RELEASE ORAL AS NEEDED
Status: DISCONTINUED | OUTPATIENT
Start: 2017-01-01 | End: 2017-07-01 | Stop reason: HOSPADM

## 2017-01-01 RX ORDER — ONDANSETRON 2 MG/ML
4 INJECTION INTRAMUSCULAR; INTRAVENOUS ONCE
Status: COMPLETED | OUTPATIENT
Start: 2017-01-01 | End: 2017-01-01

## 2017-01-01 RX ORDER — GLYCOPYRROLATE 0.2 MG/ML
0.2 INJECTION INTRAMUSCULAR; INTRAVENOUS
Status: DISCONTINUED | OUTPATIENT
Start: 2017-01-01 | End: 2017-07-01 | Stop reason: HOSPADM

## 2017-01-01 RX ORDER — POTASSIUM CHLORIDE 1.5 G/1.77G
40 POWDER, FOR SOLUTION ORAL AS NEEDED
Status: DISCONTINUED | OUTPATIENT
Start: 2017-01-01 | End: 2017-01-01 | Stop reason: SDUPTHER

## 2017-01-01 RX ORDER — ALBUMIN, HUMAN INJ 5% 5 %
25 SOLUTION INTRAVENOUS ONCE
Status: COMPLETED | OUTPATIENT
Start: 2017-01-01 | End: 2017-01-01

## 2017-01-01 RX ORDER — LABETALOL HYDROCHLORIDE 5 MG/ML
20 INJECTION, SOLUTION INTRAVENOUS
Status: DISCONTINUED | OUTPATIENT
Start: 2017-01-01 | End: 2017-07-01 | Stop reason: HOSPADM

## 2017-01-01 RX ORDER — NALOXONE HCL 0.4 MG/ML
0.4 VIAL (ML) INJECTION
Status: DISCONTINUED | OUTPATIENT
Start: 2017-01-01 | End: 2017-01-01

## 2017-01-01 RX ORDER — ACETAMINOPHEN 325 MG/1
650 TABLET ORAL EVERY 6 HOURS PRN
Status: DISCONTINUED | OUTPATIENT
Start: 2017-01-01 | End: 2017-07-01 | Stop reason: HOSPADM

## 2017-01-01 RX ORDER — SODIUM CHLORIDE 9 MG/ML
125 INJECTION, SOLUTION INTRAVENOUS CONTINUOUS
Status: DISCONTINUED | OUTPATIENT
Start: 2017-01-01 | End: 2017-01-01

## 2017-01-01 RX ORDER — SIMETHICONE 80 MG
80 TABLET,CHEWABLE ORAL 4 TIMES DAILY PRN
Status: DISCONTINUED | OUTPATIENT
Start: 2017-01-01 | End: 2017-07-01 | Stop reason: HOSPADM

## 2017-01-01 RX ORDER — SODIUM CHLORIDE 0.9 % (FLUSH) 0.9 %
10 SYRINGE (ML) INJECTION AS NEEDED
Status: DISCONTINUED | OUTPATIENT
Start: 2017-01-01 | End: 2017-01-01 | Stop reason: SDUPTHER

## 2017-01-01 RX ORDER — CEFTRIAXONE SODIUM 1 G/50ML
1 INJECTION, SOLUTION INTRAVENOUS EVERY 24 HOURS
Status: DISCONTINUED | OUTPATIENT
Start: 2017-01-01 | End: 2017-01-01

## 2017-01-01 RX ORDER — ACETAMINOPHEN 325 MG/1
650 TABLET ORAL EVERY 6 HOURS PRN
COMMUNITY

## 2017-01-01 RX ORDER — PANTOPRAZOLE SODIUM 40 MG/1
40 TABLET, DELAYED RELEASE ORAL
Status: DISCONTINUED | OUTPATIENT
Start: 2017-01-01 | End: 2017-01-01 | Stop reason: ALTCHOICE

## 2017-01-01 RX ORDER — ATORVASTATIN CALCIUM 40 MG/1
80 TABLET, FILM COATED ORAL NIGHTLY
Status: DISCONTINUED | OUTPATIENT
Start: 2017-01-01 | End: 2017-01-01

## 2017-01-01 RX ORDER — MAGNESIUM SULFATE 1 G/100ML
1 INJECTION INTRAVENOUS AS NEEDED
Status: DISCONTINUED | OUTPATIENT
Start: 2017-01-01 | End: 2017-07-01 | Stop reason: HOSPADM

## 2017-01-01 RX ORDER — POTASSIUM CHLORIDE 7.45 MG/ML
10 INJECTION INTRAVENOUS
Status: DISCONTINUED | OUTPATIENT
Start: 2017-01-01 | End: 2017-07-01 | Stop reason: HOSPADM

## 2017-01-01 RX ORDER — SODIUM CHLORIDE 9 MG/ML
30 INJECTION, SOLUTION INTRAVENOUS CONTINUOUS
Status: DISCONTINUED | OUTPATIENT
Start: 2017-01-01 | End: 2017-07-01 | Stop reason: HOSPADM

## 2017-01-01 RX ORDER — MAGNESIUM SULFATE HEPTAHYDRATE 40 MG/ML
4 INJECTION, SOLUTION INTRAVENOUS AS NEEDED
Status: DISCONTINUED | OUTPATIENT
Start: 2017-01-01 | End: 2017-07-01 | Stop reason: HOSPADM

## 2017-01-01 RX ORDER — LORAZEPAM 2 MG/ML
1 INJECTION INTRAMUSCULAR
Status: DISCONTINUED | OUTPATIENT
Start: 2017-01-01 | End: 2017-07-01 | Stop reason: HOSPADM

## 2017-01-01 RX ORDER — SULFAMETHOXAZOLE AND TRIMETHOPRIM 400; 80 MG/1; MG/1
1 TABLET ORAL EVERY 12 HOURS SCHEDULED
Status: DISCONTINUED | OUTPATIENT
Start: 2017-01-01 | End: 2017-01-01 | Stop reason: SDUPTHER

## 2017-01-01 RX ORDER — ASPIRIN 325 MG
325 TABLET ORAL DAILY
Status: DISCONTINUED | OUTPATIENT
Start: 2017-01-01 | End: 2017-01-01

## 2017-01-01 RX ORDER — ACETAMINOPHEN 325 MG/1
650 TABLET ORAL EVERY 6 HOURS PRN
Status: DISCONTINUED | OUTPATIENT
Start: 2017-01-01 | End: 2017-01-01 | Stop reason: SDUPTHER

## 2017-01-01 RX ORDER — PHENYLEPHRINE HCL IN 0.9% NACL 0.5 MG/5ML
.5-3 SYRINGE (ML) INTRAVENOUS
Status: DISCONTINUED | OUTPATIENT
Start: 2017-01-01 | End: 2017-01-01

## 2017-01-01 RX ORDER — MORPHINE SULFATE 2 MG/ML
2 INJECTION, SOLUTION INTRAMUSCULAR; INTRAVENOUS
Status: DISCONTINUED | OUTPATIENT
Start: 2017-01-01 | End: 2017-07-01 | Stop reason: HOSPADM

## 2017-01-01 RX ORDER — POTASSIUM CHLORIDE 29.8 MG/ML
20 INJECTION INTRAVENOUS
Status: DISCONTINUED | OUTPATIENT
Start: 2017-01-01 | End: 2017-07-01 | Stop reason: HOSPADM

## 2017-01-01 RX ORDER — MIDODRINE HYDROCHLORIDE 5 MG/1
10 TABLET ORAL
Status: DISCONTINUED | OUTPATIENT
Start: 2017-01-01 | End: 2017-07-01 | Stop reason: HOSPADM

## 2017-01-01 RX ORDER — POTASSIUM CHLORIDE 750 MG/1
40 CAPSULE, EXTENDED RELEASE ORAL AS NEEDED
Status: DISCONTINUED | OUTPATIENT
Start: 2017-01-01 | End: 2017-01-01 | Stop reason: SDUPTHER

## 2017-01-01 RX ORDER — MORPHINE SULFATE 4 MG/ML
4 INJECTION, SOLUTION INTRAMUSCULAR; INTRAVENOUS
Status: DISCONTINUED | OUTPATIENT
Start: 2017-01-01 | End: 2017-07-01 | Stop reason: HOSPADM

## 2017-01-01 RX ORDER — SODIUM CHLORIDE 0.9 % (FLUSH) 0.9 %
1-10 SYRINGE (ML) INJECTION AS NEEDED
Status: DISCONTINUED | OUTPATIENT
Start: 2017-01-01 | End: 2017-01-01

## 2017-01-01 RX ORDER — HYDROMORPHONE HYDROCHLORIDE 1 MG/ML
0.5 INJECTION, SOLUTION INTRAMUSCULAR; INTRAVENOUS; SUBCUTANEOUS
Status: DISCONTINUED | OUTPATIENT
Start: 2017-01-01 | End: 2017-01-01

## 2017-01-01 RX ORDER — HYDROCODONE BITARTRATE AND ACETAMINOPHEN 7.5; 325 MG/1; MG/1
1 TABLET ORAL EVERY 4 HOURS PRN
Status: DISCONTINUED | OUTPATIENT
Start: 2017-01-01 | End: 2017-07-01 | Stop reason: HOSPADM

## 2017-01-01 RX ORDER — POTASSIUM CHLORIDE 1.5 G/1.77G
40 POWDER, FOR SOLUTION ORAL AS NEEDED
Status: DISCONTINUED | OUTPATIENT
Start: 2017-01-01 | End: 2017-07-01 | Stop reason: HOSPADM

## 2017-01-01 RX ORDER — FENTANYL CITRATE 50 UG/ML
INJECTION, SOLUTION INTRAMUSCULAR; INTRAVENOUS
Status: COMPLETED
Start: 2017-01-01 | End: 2017-01-01

## 2017-01-01 RX ORDER — SODIUM CHLORIDE 9 MG/ML
50 INJECTION, SOLUTION INTRAVENOUS CONTINUOUS
Status: DISCONTINUED | OUTPATIENT
Start: 2017-01-01 | End: 2017-01-01 | Stop reason: SDUPTHER

## 2017-01-01 RX ORDER — SULFAMETHOXAZOLE AND TRIMETHOPRIM 800; 160 MG/1; MG/1
0.5 TABLET ORAL EVERY 12 HOURS SCHEDULED
Status: DISCONTINUED | OUTPATIENT
Start: 2017-01-01 | End: 2017-01-01

## 2017-01-01 RX ORDER — HEPARIN SODIUM 5000 [USP'U]/ML
2500 INJECTION, SOLUTION INTRAVENOUS; SUBCUTANEOUS EVERY 12 HOURS SCHEDULED
Status: DISCONTINUED | OUTPATIENT
Start: 2017-01-01 | End: 2017-01-01

## 2017-01-01 RX ORDER — MORPHINE SULFATE 2 MG/ML
2 INJECTION, SOLUTION INTRAMUSCULAR; INTRAVENOUS ONCE
Status: COMPLETED | OUTPATIENT
Start: 2017-01-01 | End: 2017-01-01

## 2017-01-01 RX ORDER — SODIUM CHLORIDE, SODIUM LACTATE, POTASSIUM CHLORIDE, CALCIUM CHLORIDE 600; 310; 30; 20 MG/100ML; MG/100ML; MG/100ML; MG/100ML
150 INJECTION, SOLUTION INTRAVENOUS CONTINUOUS
Status: DISCONTINUED | OUTPATIENT
Start: 2017-01-01 | End: 2017-01-01

## 2017-01-01 RX ORDER — MAGNESIUM SULFATE HEPTAHYDRATE 40 MG/ML
2 INJECTION, SOLUTION INTRAVENOUS ONCE
Status: COMPLETED | OUTPATIENT
Start: 2017-01-01 | End: 2017-01-01

## 2017-01-01 RX ORDER — MAGNESIUM SULFATE HEPTAHYDRATE 40 MG/ML
2 INJECTION, SOLUTION INTRAVENOUS AS NEEDED
Status: DISCONTINUED | OUTPATIENT
Start: 2017-01-01 | End: 2017-07-01 | Stop reason: HOSPADM

## 2017-01-01 RX ORDER — ACETAMINOPHEN 650 MG/1
650 SUPPOSITORY RECTAL EVERY 6 HOURS PRN
Status: DISCONTINUED | OUTPATIENT
Start: 2017-01-01 | End: 2017-07-01 | Stop reason: HOSPADM

## 2017-01-01 RX ORDER — NICOTINE 21 MG/24HR
1 PATCH, TRANSDERMAL 24 HOURS TRANSDERMAL EVERY 24 HOURS
Status: DISCONTINUED | OUTPATIENT
Start: 2017-01-01 | End: 2017-01-01

## 2017-01-01 RX ORDER — ALBUMIN, HUMAN INJ 5% 5 %
SOLUTION INTRAVENOUS
Status: COMPLETED
Start: 2017-01-01 | End: 2017-01-01

## 2017-01-01 RX ORDER — PANTOPRAZOLE SODIUM 40 MG/10ML
40 INJECTION, POWDER, LYOPHILIZED, FOR SOLUTION INTRAVENOUS
Status: DISCONTINUED | OUTPATIENT
Start: 2017-01-01 | End: 2017-07-01 | Stop reason: HOSPADM

## 2017-01-01 RX ORDER — ONDANSETRON 4 MG/1
4 TABLET, FILM COATED ORAL EVERY 6 HOURS PRN
Status: DISCONTINUED | OUTPATIENT
Start: 2017-01-01 | End: 2017-07-01 | Stop reason: HOSPADM

## 2017-01-01 RX ORDER — ASPIRIN 300 MG/1
300 SUPPOSITORY RECTAL DAILY
Status: DISCONTINUED | OUTPATIENT
Start: 2017-01-01 | End: 2017-01-01

## 2017-01-01 RX ORDER — ONDANSETRON 2 MG/ML
4 INJECTION INTRAMUSCULAR; INTRAVENOUS EVERY 6 HOURS PRN
Status: DISCONTINUED | OUTPATIENT
Start: 2017-01-01 | End: 2017-07-01 | Stop reason: HOSPADM

## 2017-01-01 RX ORDER — SODIUM PHOSPHATE, DIBASIC AND SODIUM PHOSPHATE, MONOBASIC 7; 19 G/133ML; G/133ML
1 ENEMA RECTAL ONCE
Status: COMPLETED | OUTPATIENT
Start: 2017-01-01 | End: 2017-01-01

## 2017-01-01 RX ORDER — HEPARIN SODIUM 1000 [USP'U]/ML
2500 INJECTION, SOLUTION INTRAVENOUS; SUBCUTANEOUS ONCE
Status: COMPLETED | OUTPATIENT
Start: 2017-01-01 | End: 2017-01-01

## 2017-01-01 RX ORDER — HEPARIN SODIUM 1000 [USP'U]/ML
1250 INJECTION, SOLUTION INTRAVENOUS; SUBCUTANEOUS ONCE
Status: COMPLETED | OUTPATIENT
Start: 2017-01-01 | End: 2017-01-01

## 2017-01-01 RX ORDER — CEFTRIAXONE SODIUM 1 G/50ML
1 INJECTION, SOLUTION INTRAVENOUS ONCE
Status: COMPLETED | OUTPATIENT
Start: 2017-01-01 | End: 2017-01-01

## 2017-01-01 RX ORDER — CEFTRIAXONE SODIUM 1 G/50ML
1 INJECTION, SOLUTION INTRAVENOUS
Status: COMPLETED | OUTPATIENT
Start: 2017-01-01 | End: 2017-01-01

## 2017-01-01 RX ORDER — MIDAZOLAM HYDROCHLORIDE 1 MG/ML
INJECTION INTRAMUSCULAR; INTRAVENOUS
Status: COMPLETED
Start: 2017-01-01 | End: 2017-01-01

## 2017-01-01 RX ORDER — POTASSIUM CHLORIDE 29.8 MG/ML
20 INJECTION INTRAVENOUS
Status: DISCONTINUED | OUTPATIENT
Start: 2017-01-01 | End: 2017-01-01 | Stop reason: DRUGHIGH

## 2017-01-01 RX ADMIN — POTASSIUM CHLORIDE 10 MEQ: 7.46 INJECTION, SOLUTION INTRAVENOUS at 06:32

## 2017-01-01 RX ADMIN — CEFTRIAXONE SODIUM 1 G: 1 INJECTION, SOLUTION INTRAVENOUS at 17:26

## 2017-01-01 RX ADMIN — ACETAMINOPHEN 650 MG: 650 SUPPOSITORY RECTAL at 18:35

## 2017-01-01 RX ADMIN — PANTOPRAZOLE SODIUM 40 MG: 40 INJECTION, POWDER, FOR SOLUTION INTRAVENOUS at 06:10

## 2017-01-01 RX ADMIN — ONDANSETRON 4 MG: 2 INJECTION INTRAMUSCULAR; INTRAVENOUS at 13:32

## 2017-01-01 RX ADMIN — ASPIRIN 325 MG ORAL TABLET 325 MG: 325 PILL ORAL at 10:06

## 2017-01-01 RX ADMIN — SODIUM CHLORIDE 100 ML/HR: 9 INJECTION, SOLUTION INTRAVENOUS at 04:20

## 2017-01-01 RX ADMIN — POTASSIUM CHLORIDE 10 MEQ: 7.46 INJECTION, SOLUTION INTRAVENOUS at 10:15

## 2017-01-01 RX ADMIN — Medication: at 08:10

## 2017-01-01 RX ADMIN — HYDROCORTISONE SODIUM SUCCINATE 50 MG: 100 INJECTION, POWDER, FOR SOLUTION INTRAMUSCULAR; INTRAVENOUS at 18:48

## 2017-01-01 RX ADMIN — ONDANSETRON 4 MG: 2 INJECTION INTRAMUSCULAR; INTRAVENOUS at 20:35

## 2017-01-01 RX ADMIN — HEPARIN SODIUM 2500 UNITS: 1000 INJECTION, SOLUTION INTRAVENOUS; SUBCUTANEOUS at 13:53

## 2017-01-01 RX ADMIN — SODIUM CHLORIDE 100 ML/HR: 9 INJECTION, SOLUTION INTRAVENOUS at 09:46

## 2017-01-01 RX ADMIN — ACETAMINOPHEN 650 MG: 650 SUPPOSITORY RECTAL at 00:39

## 2017-01-01 RX ADMIN — Medication 2.5 MCG/KG/MIN: at 20:22

## 2017-01-01 RX ADMIN — Medication 0.02 MCG/KG/MIN: at 22:21

## 2017-01-01 RX ADMIN — FENTANYL CITRATE 25 MCG: 50 INJECTION, SOLUTION INTRAMUSCULAR; INTRAVENOUS at 16:07

## 2017-01-01 RX ADMIN — ASPIRIN 300 MG: 300 SUPPOSITORY RECTAL at 08:53

## 2017-01-01 RX ADMIN — POTASSIUM CHLORIDE 20 MEQ: 29.8 INJECTION, SOLUTION INTRAVENOUS at 22:11

## 2017-01-01 RX ADMIN — POTASSIUM CHLORIDE 40 MEQ: 750 CAPSULE, EXTENDED RELEASE ORAL at 06:41

## 2017-01-01 RX ADMIN — WATER 3.9 MG: 1 INJECTION INTRAMUSCULAR; INTRAVENOUS; SUBCUTANEOUS at 12:29

## 2017-01-01 RX ADMIN — Medication 19 UNITS/KG/HR: at 05:32

## 2017-01-01 RX ADMIN — ALBUMIN HUMAN 12.5 G: 0.05 INJECTION, SOLUTION INTRAVENOUS at 21:48

## 2017-01-01 RX ADMIN — POTASSIUM CHLORIDE 40 MEQ: 750 CAPSULE, EXTENDED RELEASE ORAL at 10:06

## 2017-01-01 RX ADMIN — POTASSIUM CHLORIDE 10 MEQ: 7.46 INJECTION, SOLUTION INTRAVENOUS at 11:55

## 2017-01-01 RX ADMIN — SODIUM CHLORIDE, POTASSIUM CHLORIDE, SODIUM LACTATE AND CALCIUM CHLORIDE 150 ML/HR: 600; 310; 30; 20 INJECTION, SOLUTION INTRAVENOUS at 18:49

## 2017-01-01 RX ADMIN — WATER 35 MG: 1 INJECTION INTRAMUSCULAR; INTRAVENOUS; SUBCUTANEOUS at 12:27

## 2017-01-01 RX ADMIN — SODIUM CHLORIDE 75 ML/HR: 9 INJECTION, SOLUTION INTRAVENOUS at 12:56

## 2017-01-01 RX ADMIN — CEFTRIAXONE SODIUM 1 G: 1 INJECTION, SOLUTION INTRAVENOUS at 08:09

## 2017-01-01 RX ADMIN — Medication 3 MCG/KG/MIN: at 06:36

## 2017-01-01 RX ADMIN — MORPHINE SULFATE 4 MG: 4 INJECTION, SOLUTION INTRAMUSCULAR; INTRAVENOUS at 20:22

## 2017-01-01 RX ADMIN — THIAMINE HYDROCHLORIDE 100 ML/HR: 100 INJECTION, SOLUTION INTRAMUSCULAR; INTRAVENOUS at 13:53

## 2017-01-01 RX ADMIN — ACETAMINOPHEN 650 MG: 325 TABLET, FILM COATED ORAL at 20:06

## 2017-01-01 RX ADMIN — Medication 3 MCG/KG/MIN: at 12:24

## 2017-01-01 RX ADMIN — TAZOBACTAM SODIUM AND PIPERACILLIN SODIUM 4.5 G: .5; 4 INJECTION, POWDER, LYOPHILIZED, FOR SOLUTION INTRAVENOUS at 19:03

## 2017-01-01 RX ADMIN — Medication 12 UNITS/KG/HR: at 00:31

## 2017-01-01 RX ADMIN — MORPHINE SULFATE 4 MG: 4 INJECTION, SOLUTION INTRAMUSCULAR; INTRAVENOUS at 19:13

## 2017-01-01 RX ADMIN — PANTOPRAZOLE SODIUM 40 MG: 40 INJECTION, POWDER, FOR SOLUTION INTRAVENOUS at 06:17

## 2017-01-01 RX ADMIN — SODIUM CHLORIDE 100 ML/HR: 9 INJECTION, SOLUTION INTRAVENOUS at 22:11

## 2017-01-01 RX ADMIN — Medication 2.5 MCG/KG/MIN: at 12:28

## 2017-01-01 RX ADMIN — ACETAMINOPHEN 650 MG: 325 TABLET, FILM COATED ORAL at 15:54

## 2017-01-01 RX ADMIN — CEFTRIAXONE SODIUM 1 G: 1 INJECTION, SOLUTION INTRAVENOUS at 14:48

## 2017-01-01 RX ADMIN — ACETAMINOPHEN 650 MG: 325 TABLET, FILM COATED ORAL at 01:54

## 2017-01-01 RX ADMIN — PHENYLEPHRINE HYDROCHLORIDE 0.5 MCG/KG/MIN: 10 INJECTION INTRAVENOUS at 16:26

## 2017-01-01 RX ADMIN — SODIUM CHLORIDE 100 ML/HR: 9 INJECTION, SOLUTION INTRAVENOUS at 20:24

## 2017-01-01 RX ADMIN — NICOTINE 1 PATCH: 21 PATCH, EXTENDED RELEASE TRANSDERMAL at 21:29

## 2017-01-01 RX ADMIN — CEFTRIAXONE SODIUM 1 G: 1 INJECTION, SOLUTION INTRAVENOUS at 12:56

## 2017-01-01 RX ADMIN — Medication 3 MCG/KG/MIN: at 17:55

## 2017-01-01 RX ADMIN — MIDODRINE HYDROCHLORIDE 10 MG: 5 TABLET ORAL at 11:45

## 2017-01-01 RX ADMIN — MORPHINE SULFATE 4 MG: 4 INJECTION, SOLUTION INTRAMUSCULAR; INTRAVENOUS at 23:10

## 2017-01-01 RX ADMIN — Medication 10 ML: at 13:33

## 2017-01-01 RX ADMIN — Medication 3 MCG/KG/MIN: at 00:35

## 2017-01-01 RX ADMIN — SODIUM CHLORIDE 75 ML/HR: 9 INJECTION, SOLUTION INTRAVENOUS at 06:10

## 2017-01-01 RX ADMIN — MAGNESIUM SULFATE HEPTAHYDRATE 2 G: 40 INJECTION, SOLUTION INTRAVENOUS at 09:04

## 2017-01-01 RX ADMIN — CEFTRIAXONE SODIUM 1 G: 1 INJECTION, SOLUTION INTRAVENOUS at 13:30

## 2017-01-01 RX ADMIN — MORPHINE SULFATE 4 MG: 4 INJECTION, SOLUTION INTRAMUSCULAR; INTRAVENOUS at 22:10

## 2017-01-01 RX ADMIN — HYDROMORPHONE HYDROCHLORIDE 0.5 MG: 1 INJECTION, SOLUTION INTRAMUSCULAR; INTRAVENOUS; SUBCUTANEOUS at 06:03

## 2017-01-01 RX ADMIN — Medication 3 MCG/KG/MIN: at 18:44

## 2017-01-01 RX ADMIN — ALBUMIN HUMAN 12.5 G: 0.05 INJECTION, SOLUTION INTRAVENOUS at 21:00

## 2017-01-01 RX ADMIN — Medication 0.2 MCG/KG/MIN: at 12:27

## 2017-01-01 RX ADMIN — Medication 3 MCG/KG/MIN: at 23:12

## 2017-01-01 RX ADMIN — GADOBENATE DIMEGLUMINE 8 ML: 529 INJECTION, SOLUTION INTRAVENOUS at 02:45

## 2017-01-01 RX ADMIN — Medication 10 ML: at 13:36

## 2017-01-01 RX ADMIN — POTASSIUM CHLORIDE 20 MEQ: 29.8 INJECTION, SOLUTION INTRAVENOUS at 10:30

## 2017-01-01 RX ADMIN — POTASSIUM CHLORIDE 20 MEQ: 29.8 INJECTION, SOLUTION INTRAVENOUS at 01:49

## 2017-01-01 RX ADMIN — PANTOPRAZOLE SODIUM 40 MG: 40 TABLET, DELAYED RELEASE ORAL at 06:03

## 2017-01-01 RX ADMIN — MIDAZOLAM 1 MG: 1 INJECTION INTRAMUSCULAR; INTRAVENOUS at 16:07

## 2017-01-01 RX ADMIN — ONDANSETRON 4 MG: 4 TABLET, FILM COATED ORAL at 11:08

## 2017-01-01 RX ADMIN — IOPAMIDOL 100 ML: 755 INJECTION, SOLUTION INTRAVENOUS at 13:00

## 2017-01-01 RX ADMIN — MORPHINE SULFATE 2 MG: 2 INJECTION, SOLUTION INTRAMUSCULAR; INTRAVENOUS at 13:35

## 2017-01-01 RX ADMIN — PANTOPRAZOLE SODIUM 40 MG: 40 TABLET, DELAYED RELEASE ORAL at 06:45

## 2017-01-01 RX ADMIN — ENOXAPARIN SODIUM 40 MG: 40 INJECTION SUBCUTANEOUS at 21:34

## 2017-01-01 RX ADMIN — SULFAMETHOXAZOLE AND TRIMETHOPRIM 80 MG: 800; 160 TABLET ORAL at 08:23

## 2017-01-01 RX ADMIN — ENOXAPARIN SODIUM 30 MG: 30 INJECTION SUBCUTANEOUS at 20:31

## 2017-01-01 RX ADMIN — POTASSIUM CHLORIDE 40 MEQ: 750 CAPSULE, EXTENDED RELEASE ORAL at 08:23

## 2017-01-01 RX ADMIN — Medication 2.5 MCG/KG/MIN: at 04:29

## 2017-01-01 RX ADMIN — HEPARIN SODIUM 1250 UNITS: 1000 INJECTION, SOLUTION INTRAVENOUS; SUBCUTANEOUS at 01:46

## 2017-01-01 RX ADMIN — SODIUM CHLORIDE 100 ML/HR: 9 INJECTION, SOLUTION INTRAVENOUS at 13:40

## 2017-01-01 RX ADMIN — Medication 2.8 MCG/KG/MIN: at 05:38

## 2017-01-01 RX ADMIN — ATORVASTATIN CALCIUM 80 MG: 40 TABLET, FILM COATED ORAL at 20:06

## 2017-01-01 RX ADMIN — ACETAMINOPHEN 650 MG: 325 TABLET, FILM COATED ORAL at 07:45

## 2017-01-01 RX ADMIN — HYDROMORPHONE HYDROCHLORIDE 0.5 MG: 1 INJECTION, SOLUTION INTRAMUSCULAR; INTRAVENOUS; SUBCUTANEOUS at 20:31

## 2017-01-01 RX ADMIN — Medication 0.1 MCG/KG/MIN: at 02:08

## 2017-01-01 RX ADMIN — ENOXAPARIN SODIUM 40 MG: 40 INJECTION SUBCUTANEOUS at 21:29

## 2017-01-01 RX ADMIN — SODIUM PHOSPHATE, DIBASIC AND SODIUM PHOSPHATE, MONOBASIC 1 ENEMA: 7; 19 ENEMA RECTAL at 15:25

## 2017-01-01 RX ADMIN — SODIUM CHLORIDE 75 ML/HR: 9 INJECTION, SOLUTION INTRAVENOUS at 21:30

## 2017-01-01 RX ADMIN — MIDAZOLAM HYDROCHLORIDE 1 MG: 5 INJECTION, SOLUTION INTRAMUSCULAR; INTRAVENOUS at 16:23

## 2017-01-01 RX ADMIN — POTASSIUM CHLORIDE 20 MEQ: 29.8 INJECTION, SOLUTION INTRAVENOUS at 23:50

## 2017-01-01 RX ADMIN — ACETAMINOPHEN 650 MG: 325 TABLET, FILM COATED ORAL at 11:08

## 2017-01-01 RX ADMIN — THIAMINE HYDROCHLORIDE 100 ML/HR: 100 INJECTION, SOLUTION INTRAMUSCULAR; INTRAVENOUS at 10:46

## 2017-01-01 RX ADMIN — SODIUM CHLORIDE 100 ML/HR: 9 INJECTION, SOLUTION INTRAVENOUS at 06:17

## 2017-01-01 RX ADMIN — SODIUM CHLORIDE 1320 ML: 9 INJECTION, SOLUTION INTRAVENOUS at 13:43

## 2017-01-01 RX ADMIN — Medication 2.5 MCG/KG/MIN: at 13:35

## 2017-01-01 RX ADMIN — Medication 0.24 MCG/KG/MIN: at 00:36

## 2017-01-01 RX ADMIN — POTASSIUM CHLORIDE 40 MEQ: 750 CAPSULE, EXTENDED RELEASE ORAL at 14:48

## 2017-01-01 RX ADMIN — SODIUM CHLORIDE 125 ML/HR: 9 INJECTION, SOLUTION INTRAVENOUS at 13:30

## 2017-01-01 RX ADMIN — HYDROMORPHONE HYDROCHLORIDE 0.5 MG: 1 INJECTION, SOLUTION INTRAMUSCULAR; INTRAVENOUS; SUBCUTANEOUS at 16:34
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
## 2017-06-24 PROBLEM — N39.0 SEPSIS SECONDARY TO UTI (HCC): Status: ACTIVE | Noted: 2017-01-01

## 2017-06-24 PROBLEM — R11.0 NAUSEA: Status: ACTIVE | Noted: 2017-01-01

## 2017-06-24 PROBLEM — A41.9 SEPSIS SECONDARY TO UTI (HCC): Status: ACTIVE | Noted: 2017-01-01

## 2017-06-24 PROBLEM — Z72.0 TOBACCO ABUSE: Chronic | Status: ACTIVE | Noted: 2017-01-01

## 2017-06-24 PROBLEM — E87.6 HYPOKALEMIA: Status: ACTIVE | Noted: 2017-01-01

## 2017-06-24 PROBLEM — E87.20 LACTIC ACIDOSIS: Status: ACTIVE | Noted: 2017-01-01

## 2017-06-24 PROBLEM — R10.84 GENERALIZED ABDOMINAL PAIN: Status: ACTIVE | Noted: 2017-01-01

## 2017-06-27 PROBLEM — K56.699 COLONIC STRICTURE (HCC): Status: ACTIVE | Noted: 2017-01-01

## 2017-06-27 PROBLEM — I63.9 STROKE (HCC): Status: ACTIVE | Noted: 2017-01-01

## 2017-06-30 PROBLEM — I74.10 AORTIC THROMBUS (HCC): Status: ACTIVE | Noted: 2017-01-01

## 2017-07-01 VITALS
BODY MASS INDEX: 17.01 KG/M2 | OXYGEN SATURATION: 93 % | TEMPERATURE: 97.9 F | RESPIRATION RATE: 36 BRPM | WEIGHT: 96 LBS | DIASTOLIC BLOOD PRESSURE: 71 MMHG | HEIGHT: 63 IN | SYSTOLIC BLOOD PRESSURE: 93 MMHG

## 2017-07-01 NOTE — DISCHARGE SUMMARY
Admit date: 6/24/2017  Date/Time of Death:  6/30/17 @ 2346    Admit Diagnosis:  Principal)Sepsis secondary to UTI      Hypokalemia     Lactic acidosis     Generalized abdominal pain     Tobacco abuse (Chronic)     Nausea     Falls (Chronic)         Discharge Diagnosis:     Principal Problem:    Stroke, s/p TPA on 6/27/2017  Active Problems:    Sepsis secondary to UTI    Hypokalemia    Lactic acidosis    Generalized abdominal pain    Tobacco abuse    Nausea    Falls    Colonic stricture, s/p dilatation    Aortic thrombus      Hospital Course:  Patient is a 72 y.o. female who presented 6/24/17 with abdominal pain and urosepsis and admitted by the Hospitalist service.  She was noted to have had a 60 lb weight loss and Dr. Dominguez with Colorectal was consulted upon admission and performed a colonoscopy on 6/24 which identified a colonic stricture that was stented.   She was maintained on Rocephin for her gram negative UTI.  Her stent continued to function although she continued to have poor PO intake and continuing issues w/ gaseous distension/abdominal pain.    On 6/27/17 she had an incidence of L hemiplegia and a Code 19 was called.  Dr. Montero w/ Neurology was consulted and TPA was administered and patient was moved to ICU.  She had issues w/ hypotension and was started on Neosynephrine gtt to maintain BP with the eventual initiation of levophed.  She remained poorly responsive despite maintaining her SBP w/ a worsening of her NIH to 30 (previous 21).  Heparin gtt for anticoagulation was begun on 6/28/17.  TTE performed on 6/27 showed no cardiac source for emboli.  Her clinical standpoint remained unchanged despite the addition of midodrine.  On 6/30/17 she had a CT of her abdomen which found evidence of a bowel perforation. Dr. Arenas was consulted and, given her comorbidities and the dismal likelihood of a significantly improved outcome, the decision was reached w/ the family to not persue surgery and to make the  patient a conditional code.  Once more family arrived, they decided to transition her to comfort measures only.  The patient was provided w/ comfort medications and went into cardiac arrest and was pronounced  @ 2346 on 17.          Procedures Performed:  Procedure(s):  COLONOSCOPY AT BEDSIDE - 17       Consults:   Dr. ENMANUEL Dominguez - Colorectal  Dr. MILLER Montero - Neurology  Dr. MARCIE Tabor - Cardiology      Condition on Discharge:        Discharge Disposition:  [facility]       Jamal DONALD Pearce, APRN  17  3:51 AM    Time: Discharge 20 min

## 2017-07-01 NOTE — SIGNIFICANT NOTE
Exam confirms with auscultation zero audible heart tones and zero audible respirations. Ms.Loretta Quinn was pronounced dead on  06/30/2017 at 2346.  MD notified by Patient's RN.    Naina Enriquez RN  Clinical House Supervisor  7/1/2017 12:10 AM

## 2017-07-01 NOTE — SIGNIFICANT NOTE
Spoke w/ family and identified NOK.  We discussed her worsening clinical status despite maximum therapy and they have decided to proceed w/ comfort measures only.  RN aware, orders adjusted.

## 2017-07-01 NOTE — PLAN OF CARE
Problem: Fall Risk (Adult)  Goal: Identify Related Risk Factors and Signs and Symptoms  Outcome: Ongoing (interventions implemented as appropriate)  Goal: Absence of Falls  Outcome: Ongoing (interventions implemented as appropriate)    Problem: Skin Integrity Impairment, Risk/Actual (Adult)  Goal: Identify Related Risk Factors and Signs and Symptoms  Outcome: Ongoing (interventions implemented as appropriate)    Problem: Pressure Ulcer Risk ([place] Scale) (Adult,Obstetrics,Pediatric)  Goal: Identify Related Risk Factors and Signs and Symptoms  Outcome: Ongoing (interventions implemented as appropriate)  Goal: Skin Integrity  Outcome: Ongoing (interventions implemented as appropriate)    Problem: Sensory/Perceptual Alteration (Adult)  Goal: Identify Related Risk Factors and Signs and Symptoms  Outcome: Ongoing (interventions implemented as appropriate)  Goal: Functional Ability/Safety  Outcome: Ongoing (interventions implemented as appropriate)  Goal: Balanced Response to Sensory Input  Outcome: Ongoing (interventions implemented as appropriate)    Problem: Pain, Acute (Adult)  Goal: Identify Related Risk Factors and Signs and Symptoms  Outcome: Ongoing (interventions implemented as appropriate)  Goal: Acceptable Pain Control/Comfort Level  Outcome: Ongoing (interventions implemented as appropriate)

## 2017-07-03 LAB — BACTERIA BLD CULT: NORMAL

## 2017-07-03 NOTE — THERAPY DISCHARGE NOTE
Acute Care - Occupational Therapy Discharge Summary  [facility]     Patient Name: Tamra Quinn  : 1945  MRN: [id number]    Today's Date: 7/3/2017  Onset of Illness/Injury or Date of Surgery Date: 17    Date of Referral to OT: 17  Referring Physician: Dr. Noguera      Admit Date: 2017        OT Recommendation and Plan    Visit Dx:    ICD-10-CM ICD-9-CM   1. Altered mental status, unspecified R41.82 780.97   2. Acute UTI N39.0 599.0   3. Generalized abdominal pain R10.84 789.07   4. Sepsis, due to unspecified organism A41.9 038.9     995.91   5. Impaired functional mobility, balance, gait, and endurance Z74.09 V49.89   6. Cognitive communication deficit R41.841 799.52   7. Impaired mobility and ADLs Z74.09 799.89                     OT Goals       17 1208          Transfer Training OT LTG    Transfer Training OT LTG, Date Established 17  -AR      Transfer Training OT LTG, Time to Achieve 1 wk  -AR      Transfer Training OT LTG, Activity Type sit to stand/stand to sit;toilet  -AR      Transfer Training OT LTG, [place] Level verbal cues required;minimum assist (75% patient effort);2 person assist required  -AR      Transfer Training OT LTG, Assist Device walker, rolling;commode, bedside  -AR      Strength OT LTG    Strength Goal OT LTG, Date Established 17  -AR      Strength Goal OT LTG, Measure to Achieve Pt will demonstrate RUE MMT 2+/5 to support ADL function   -AR      Follow Directions OT LTG    Follow Directions OT LTG, Date Established 17  -AR      Follow Directions OT LTG, Time to Achieve 1 wk  -AR      Follow Directions OT LTG, Activity Type 50% treatment session;75% treatment session;1-Step  -AR      Follow Directions OT LTG, [place] Level with verbal cues  -AR      Tracking OT LTG    Tracking OT LTG, Date Established 17  -AR      Tracking OT LTG, Time to Achieve 1 wk  -AR      Tracking OT LTG, Activity Type track to right;track to  left;50% treatment session  -AR      Tracking OT LTG, [place] Level with tactile cues;with verbal cues  -AR      UB Dressing OT LTG    UB Dressing Goal OT LTG, Date Established 17  -AR      UB Dressing Goal OT LTG, Time to Achieve 1 wk  -AR      UB Dressing Goal OT LTG, Activity Type Pt will complete UB dressing task   -AR      UB Dressing Goal OT LTG, [place] Level verbal cues required;moderate assist (50% patient effort)  -AR        User Key  (r) = Recorded By, (t) = Taken By, (c) = Cosigned By    Initials Name Provider Type    AR Kim Valdovinos, OT Occupational Therapist                  OT Discharge Summary  Reason for Discharge: Patient   Outcomes Achieved: Refer to plan of care for updates on goals achieved  Discharge Destination: other (comment) (pt )      Loren Marroquin, OT  7/3/2017

## 2017-07-04 NOTE — THERAPY DISCHARGE NOTE
Acute Care - Physical Therapy Discharge Summary  [facility]       Patient Name: Tamra Quinn  : 1945  MRN: [id number]    Today's Date: 2017  Onset of Illness/Injury or Date of Surgery Date: 17    Date of Referral to PT: 17  Referring Physician: Dr. Noguera      Admit Date: 2017      PT Recommendation and Plan    Visit Dx:    ICD-10-CM ICD-9-CM   1. Altered mental status, unspecified R41.82 780.97   2. Acute UTI N39.0 599.0   3. Generalized abdominal pain R10.84 789.07   4. Sepsis, due to unspecified organism A41.9 038.9     995.91   5. Impaired functional mobility, balance, gait, and endurance Z74.09 V49.89   6. Cognitive communication deficit R41.841 799.52   7. Impaired mobility and ADLs Z74.09 799.89                       IP PT Goals       17 1450 17 1203 17 1145    Bed Mobility PT LTG    Bed Mobility PT LTG, Time to Achieve  2 wks  -LS     Bed Mobility PT LTG, Activity Type  supine to sit/sit to supine  -LS     Bed Mobility PT LTG, [place] Level  minimum assist (75% patient effort)  -LS     Bed Mobility PT LTG, Date Goal Reviewed  17  -LS     Bed Mobility PT LTG, Outcome goal ongoing  -LS goal revised  -LS goal ongoing  -KM    Transfer Training PT LTG    Transfer Training PT LTG, Time to Achieve  2 wks  -LS     Transfer Training PT LTG, Activity Type  sit to stand/stand to sit  -LS     Transfer Training PT LTG, [place] Level  minimum assist (75% patient effort);2 person assist required  -LS     Transfer Training PT LTG, Assist Device  walker, rolling  -LS     Transfer Training PT  LTG, Date Goal Reviewed  17  -LS     Transfer Training PT LTG, Outcome goal ongoing  -LS goal revised  -LS goal ongoing  -KM    Gait Training PT STG    Gait Training Goal PT STG, Time to Achieve  1 wk  -LS     Gait Training Goal PT STG, [place] Level  moderate assist (50% patient effort);2 person assist required  -LS     Gait Training Goal PT STG,  Distance to Achieve  25  -LS     Gait Training Goal PT STG, Date Goal Reviewed  06/29/17  -LS     Gait Training Goal PT STG, Outcome goal ongoing  -LS goal revised  -LS goal ongoing  -KM    Gait Training PT LTG    Gait Training Goal PT LTG, Time to Achieve  2 wks  -LS     Gait Training Goal PT LTG, [place] Level  minimum assist (75% patient effort);2 person assist required  -LS     Gait Training Goal PT LTG, Distance to Achieve  100  -LS     Gait Training Goal PT LTG, Date Goal Reviewed  06/29/17  -LS     Gait Training Goal PT LTG, Outcome goal ongoing  -LS goal revised  -LS goal ongoing  -KM      06/25/17 1510          Bed Mobility PT LTG    Bed Mobility PT LTG, Date Established 06/25/17  -KL      Bed Mobility PT LTG, Time to Achieve 4 days  -KL      Bed Mobility PT LTG, Activity Type all bed mobility  -KL      Bed Mobility PT LTG, [place] Level independent  -KL      Transfer Training PT LTG    Transfer Training PT LTG, Date Established 06/25/17  -KL      Transfer Training PT LTG, Time to Achieve 4 days  -KL      Transfer Training PT LTG, Activity Type all transfers  -KL      Transfer Training PT LTG, [place] Level supervision required  -KL      Gait Training PT STG    Gait Training Goal PT STG, Date Established 06/25/17  -KL      Gait Training Goal PT STG, Time to Achieve 2 days  -KL      Gait Training Goal PT STG, [place] Level contact guard assist  -KL      Gait Training Goal PT STG, Assist Device walker, rolling  -KL      Gait Training Goal PT STG, Distance to Achieve 150  -KL      Gait Training PT LTG    Gait Training Goal PT LTG, Date Established 06/25/17  -KL      Gait Training Goal PT LTG, Time to Achieve 4 days  -KL      Gait Training Goal PT LTG, [place] Level supervision required  -KL      Gait Training Goal PT LTG, Assist Device walker, rolling  -KL      Gait Training Goal PT LTG, Distance to Achieve 300  -KL        User Key  (r) = Recorded By, (t) = Taken By, (c) = Cosigned By     Initials Name Provider Type    HAYLEY Potts, PT Physical Therapist    KM Leslye Mckeon, PT Physical Therapist    LS Ana Maria Ludwig, PT Physical Therapist              PT Discharge Summary  Reason for Discharge: Patient       Dulce Galvez, PT   2017

## 2017-07-05 LAB — BACTERIA SPEC AEROBE CULT: NORMAL

## 2017-07-06 LAB
BACTERIA SPEC AEROBE CULT: ABNORMAL
GRAM STN SPEC: ABNORMAL
ISOLATED FROM: ABNORMAL